# Patient Record
Sex: MALE | NOT HISPANIC OR LATINO | URBAN - METROPOLITAN AREA
[De-identification: names, ages, dates, MRNs, and addresses within clinical notes are randomized per-mention and may not be internally consistent; named-entity substitution may affect disease eponyms.]

---

## 2020-12-04 ENCOUNTER — OFFICE VISIT CONVERTED (OUTPATIENT)
Dept: NEUROSURGERY | Facility: CLINIC | Age: 35
End: 2020-12-04
Attending: PHYSICIAN ASSISTANT
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
## 2021-05-14 VITALS — BODY MASS INDEX: 32.33 KG/M2 | TEMPERATURE: 97 F | HEIGHT: 69 IN | WEIGHT: 218.31 LBS
